# Patient Record
Sex: FEMALE | Race: WHITE | Employment: OTHER | URBAN - METROPOLITAN AREA
[De-identification: names, ages, dates, MRNs, and addresses within clinical notes are randomized per-mention and may not be internally consistent; named-entity substitution may affect disease eponyms.]

---

## 2023-02-04 ENCOUNTER — HOSPITAL ENCOUNTER (EMERGENCY)
Age: 68
Discharge: HOME | End: 2023-02-05
Attending: EMERGENCY MEDICINE
Payer: MEDICARE

## 2023-02-04 ENCOUNTER — APPOINTMENT (OUTPATIENT)
Dept: GENERAL RADIOLOGY | Age: 68
End: 2023-02-04
Attending: EMERGENCY MEDICINE
Payer: MEDICARE

## 2023-02-04 DIAGNOSIS — J20.9 ACUTE BRONCHITIS, UNSPECIFIED ORGANISM: ICD-10-CM

## 2023-02-04 DIAGNOSIS — J04.0 LARYNGITIS: Primary | ICD-10-CM

## 2023-02-04 LAB
ATRIAL RATE: 92 BPM
CALCULATED P AXIS, ECG09: 68 DEGREES
CALCULATED R AXIS, ECG10: 88 DEGREES
CALCULATED T AXIS, ECG11: 59 DEGREES
DIAGNOSIS, 93000: NORMAL
P-R INTERVAL, ECG05: 188 MS
Q-T INTERVAL, ECG07: 330 MS
QRS DURATION, ECG06: 72 MS
QTC CALCULATION (BEZET), ECG08: 408 MS
VENTRICULAR RATE, ECG03: 92 BPM

## 2023-02-04 PROCEDURE — 80048 BASIC METABOLIC PNL TOTAL CA: CPT

## 2023-02-04 PROCEDURE — 87636 SARSCOV2 & INF A&B AMP PRB: CPT

## 2023-02-04 PROCEDURE — 71045 X-RAY EXAM CHEST 1 VIEW: CPT

## 2023-02-04 PROCEDURE — 99285 EMERGENCY DEPT VISIT HI MDM: CPT

## 2023-02-04 PROCEDURE — 85379 FIBRIN DEGRADATION QUANT: CPT

## 2023-02-04 PROCEDURE — 85025 COMPLETE CBC W/AUTO DIFF WBC: CPT

## 2023-02-05 ENCOUNTER — APPOINTMENT (OUTPATIENT)
Dept: CT IMAGING | Age: 68
End: 2023-02-05
Attending: EMERGENCY MEDICINE
Payer: MEDICARE

## 2023-02-05 VITALS
SYSTOLIC BLOOD PRESSURE: 118 MMHG | RESPIRATION RATE: 18 BRPM | HEART RATE: 93 BPM | HEIGHT: 60 IN | DIASTOLIC BLOOD PRESSURE: 65 MMHG | OXYGEN SATURATION: 96 % | BODY MASS INDEX: 31.41 KG/M2 | WEIGHT: 160 LBS | TEMPERATURE: 98.1 F

## 2023-02-05 LAB
ANION GAP SERPL CALC-SCNC: 7 MMOL/L (ref 3–18)
BASOPHILS # BLD: 0 K/UL (ref 0–0.1)
BASOPHILS NFR BLD: 0 % (ref 0–2)
BUN SERPL-MCNC: 18 MG/DL (ref 7–18)
BUN/CREAT SERPL: 25 (ref 12–20)
CALCIUM SERPL-MCNC: 9.7 MG/DL (ref 8.5–10.1)
CHLORIDE SERPL-SCNC: 104 MMOL/L (ref 100–111)
CO2 SERPL-SCNC: 25 MMOL/L (ref 21–32)
CREAT SERPL-MCNC: 0.72 MG/DL (ref 0.6–1.3)
D DIMER PPP FEU-MCNC: 0.7 UG/ML(FEU)
DIFFERENTIAL METHOD BLD: ABNORMAL
EOSINOPHIL # BLD: 0.1 K/UL (ref 0–0.4)
EOSINOPHIL NFR BLD: 1 % (ref 0–5)
ERYTHROCYTE [DISTWIDTH] IN BLOOD BY AUTOMATED COUNT: 13.4 % (ref 11.6–14.5)
FLUAV RNA SPEC QL NAA+PROBE: NOT DETECTED
FLUBV RNA SPEC QL NAA+PROBE: NOT DETECTED
GLUCOSE SERPL-MCNC: 130 MG/DL (ref 74–99)
HCT VFR BLD AUTO: 40.8 % (ref 35–45)
HGB BLD-MCNC: 13.8 G/DL (ref 12–16)
IMM GRANULOCYTES # BLD AUTO: 0 K/UL
IMM GRANULOCYTES NFR BLD AUTO: 0 %
LYMPHOCYTES # BLD: 6.6 K/UL (ref 0.9–3.6)
LYMPHOCYTES NFR BLD: 48 % (ref 21–52)
MCH RBC QN AUTO: 30.9 PG (ref 24–34)
MCHC RBC AUTO-ENTMCNC: 33.8 G/DL (ref 31–37)
MCV RBC AUTO: 91.3 FL (ref 78–100)
MONOCYTES # BLD: 1.1 K/UL (ref 0.05–1.2)
MONOCYTES NFR BLD: 8 % (ref 3–10)
NEUTS SEG # BLD: 5.9 K/UL (ref 1.8–8)
NEUTS SEG NFR BLD: 43 % (ref 40–73)
NRBC # BLD: 0 K/UL (ref 0–0.01)
NRBC BLD-RTO: 0 PER 100 WBC
PLATELET # BLD AUTO: 401 K/UL (ref 135–420)
PLATELET COMMENTS,PCOM: ABNORMAL
PMV BLD AUTO: 9.2 FL (ref 9.2–11.8)
POTASSIUM SERPL-SCNC: 3.6 MMOL/L (ref 3.5–5.5)
RBC # BLD AUTO: 4.47 M/UL (ref 4.2–5.3)
RBC MORPH BLD: ABNORMAL
SARS-COV-2 RNA RESP QL NAA+PROBE: NOT DETECTED
SODIUM SERPL-SCNC: 136 MMOL/L (ref 136–145)
WBC # BLD AUTO: 13.7 K/UL (ref 4.6–13.2)

## 2023-02-05 PROCEDURE — 71275 CT ANGIOGRAPHY CHEST: CPT

## 2023-02-05 PROCEDURE — 74011250637 HC RX REV CODE- 250/637: Performed by: EMERGENCY MEDICINE

## 2023-02-05 PROCEDURE — 70490 CT SOFT TISSUE NECK W/O DYE: CPT

## 2023-02-05 PROCEDURE — 74011000636 HC RX REV CODE- 636: Performed by: EMERGENCY MEDICINE

## 2023-02-05 RX ORDER — BENZONATATE 100 MG/1
200 CAPSULE ORAL
Status: DISCONTINUED | OUTPATIENT
Start: 2023-02-05 | End: 2023-02-05 | Stop reason: HOSPADM

## 2023-02-05 RX ORDER — AZITHROMYCIN 250 MG/1
TABLET, FILM COATED ORAL
Qty: 4 TABLET | Refills: 0 | Status: SHIPPED | OUTPATIENT
Start: 2023-02-05 | End: 2023-02-10

## 2023-02-05 RX ORDER — IBUPROFEN 400 MG/1
800 TABLET ORAL
Status: COMPLETED | OUTPATIENT
Start: 2023-02-05 | End: 2023-02-05

## 2023-02-05 RX ORDER — KETOROLAC TROMETHAMINE 10 MG/1
10 TABLET, FILM COATED ORAL
Qty: 12 TABLET | Refills: 0 | Status: SHIPPED | OUTPATIENT
Start: 2023-02-05

## 2023-02-05 RX ORDER — AZITHROMYCIN 250 MG/1
500 TABLET, FILM COATED ORAL
Status: COMPLETED | OUTPATIENT
Start: 2023-02-05 | End: 2023-02-05

## 2023-02-05 RX ORDER — BENZONATATE 100 MG/1
200 CAPSULE ORAL
Qty: 20 CAPSULE | Refills: 0 | Status: SHIPPED | OUTPATIENT
Start: 2023-02-05 | End: 2023-02-12

## 2023-02-05 RX ADMIN — BENZONATATE 200 MG: 100 CAPSULE ORAL at 03:26

## 2023-02-05 RX ADMIN — IOPAMIDOL 87 ML: 755 INJECTION, SOLUTION INTRAVENOUS at 02:37

## 2023-02-05 RX ADMIN — IBUPROFEN 800 MG: 400 TABLET, FILM COATED ORAL at 03:26

## 2023-02-05 RX ADMIN — AZITHROMYCIN MONOHYDRATE 500 MG: 250 TABLET ORAL at 03:26

## 2023-02-06 NOTE — ED PROVIDER NOTES
[facility] EMERGENCY DEPT  EMERGENCY DEPARTMENT ENCOUNTER       Pt Name: Oleg Phan  MRN: [id number]  [place] 1955  Date of evaluation: 2/4/2023  Provider: Naseem Davidson MD   PCP: Nestor Johns DO  Note Started: 8:44 AM 2/6/23     CHIEF COMPLAINT       Chief Complaint   Patient presents with    Shortness of Breath        HISTORY OF PRESENT ILLNESS: 1 or more elements      History From: Patient  HPI Limitations : None     Oleg Phan is a 69 y.o. female who presents ED complaining of difficulty breathing with shortness of breath, hoarse voice. She reports symptoms began about a week ago with a sore throat. Sore throat was scratchy sensation in throat. She admits to some cough. Over the last day or so she began having increased congestion and hoarseness of voice. She also felt like she was not getting enough air. She has no headache, earache, chest pain, wheezing, or abdomen pain. She however admits to cough with production of yellowish sputum. She also admits to increased congestion in throat. She has taken over-the-counter meds with no relief. She denies exposure to COVID or flu. Nursing Notes were all reviewed and agreed with or any disagreements were addressed in the HPI. REVIEW OF SYSTEMS      Review of Systems     Positives and Pertinent negatives as per HPI. PAST HISTORY     Past Medical History:  No past medical history on file. Past Surgical History:  No past surgical history on file. Family History:  No family history on file. Social History:        Allergies:  No Known Allergies    CURRENT MEDICATIONS      Discharge Medication List as of 2/5/2023  3:21 AM          SCREENINGS               No data recorded         PHYSICAL EXAM      Vitals:    02/04/23 2350 02/05/23 0257 02/05/23 0312 02/05/23 0317   BP: (!) 155/85 135/78 115/65 118/65   Pulse:       Resp:       Temp:       SpO2: 99%  96% 96%   Weight:       Height:         Physical Exam    Nursing notes and vital signs reviewed    Constitutional: Non toxic appearing, no distress but has audible stridor. Head: Normocephalic, Atraumatic  Stridor is present on exam however uvula is midline is not swollen, airway appears open, there is minimal redness particularly right side of throat just behind the tonsils. No exudates noted. Eyes: EOMI  Neck: Supple  Cardiovascular: Regular rate and rhythm, no murmurs, rubs, or gallops  Chest: Normal work of breathing and chest excursion bilaterally  Lungs: Clear to ausculation bilaterally  Abdomen: Soft, non tender, non distended, normoactive bowel sounds  Back: No evidence of trauma or deformity  Extremities: No evidence of trauma or deformity, no LE edema  Skin: Warm and dry, normal cap refill  Neuro: Alert and appropriate, CN intact, normal speech, strength and sensation full and symmetric bilaterally, normal gait, normal coordination  Psychiatric: Normal mood and affect     DIAGNOSTIC RESULTS   LABS:     No results found for this or any previous visit (from the past 12 hour(s)). RADIOLOGY:  Non-plain film images such as CT, Ultrasound and MRI are read by the radiologist. Plain radiographic images are visualized and preliminarily interpreted by the ED Provider with the below findings:          Interpretation per the Radiologist below, if available at the time of this note:     No results found.       PROCEDURES        CRITICAL CARE TIME       EMERGENCY DEPARTMENT COURSE and DIFFERENTIAL DIAGNOSIS/MDM   Vitals:    Vitals:    02/04/23 2350 02/05/23 0257 02/05/23 0312 02/05/23 0317   BP: (!) 155/85 135/78 115/65 118/65   Pulse:       Resp:       Temp:       SpO2: 99%  96% 96%   Weight:       Height:            Patient was given the following medications:  Medications   iopamidoL (ISOVUE-370) 370 mg iodine /mL (76 %) injection 100 mL (87 mL IntraVENous Given 2/5/23 0237)   azithromycin (ZITHROMAX) tablet 500 mg (500 mg Oral Given 2/5/23 0326)   ibuprofen (MOTRIN) tablet 800 mg (800 mg Oral Given 2/5/23 0326)       CONSULTS: (Who and What was discussed)  None    Chronic Conditions:     Social Determinants affecting Dx or Tx: None    Records Reviewed (source and summary): Previous Radiology studies, Previous Laboratory studies, and Nursing notes    CC/HPI Summary, DDx, ED Course, and Reassessment:   Patient presents with laryngitis worse over the last 4 days. She however has concerning stridor and exam is only remarkable for slight erythema right throat. No exudates. Differential included foreign body, URI, malignancy, bronchitis, pneumonia. Chest x-ray initially showed questionable infiltrate right lower lobe. Soft tissue CT with no evidence of epiglottitis, there was noted increased mucus left side of throat. CTA chest showed no PE or pneumonia but incidental finding of 7 mm nodule right lower lobe. Patient now coughing up yellowish sputum and being discharged on Zithromax for possible early bronchitis. She was given referral to PCP and ENT and given precautions for returning to ED. Disposition Considerations (Tests not done, Shared Decision Making, Pt Expectation of Test or Tx.):      FINAL IMPRESSION     1. Laryngitis    2. Acute bronchitis, unspecified organism          DISPOSITION/PLAN   Discharged    Discharge Note: The patient is stable for discharge home. The signs, symptoms, diagnosis, and discharge instructions have been discussed, understanding conveyed, and agreed upon. The patient is to follow up as recommended or return to ER should their symptoms worsen.       PATIENT REFERRED TO:  Follow-up Information       Follow up With Specialties Details Why Contact Varghese Sandoval DO Internal Medicine Physician In 2 days  [address] [facility]      [facility] EMERGENCY DEPT Emergency Medicine  If symptoms worsen [address] [facility] [id number]  [phone]              DISCHARGE MEDICATIONS:  Discharge Medication List as of 2/5/2023  3:21 AM        START taking these medications    Details   azithromycin (Zithromax Z-Osvaldo) 250 mg tablet 1 tab daily, Normal, Disp-4 Tablet, R-0      benzonatate (Tessalon Perles) 100 mg capsule Take 2 Capsules by mouth three (3) times daily as needed for Cough for up to 7 days. , Normal, Disp-20 Capsule, R-0      ketorolac (TORADOL) 10 mg tablet Take 1 Tablet by mouth every eight (8) hours as needed for Pain., Normal, Disp-12 Tablet, R-0               DISCONTINUED MEDICATIONS:  Discharge Medication List as of 2/5/2023  3:21 AM          I am the Primary Clinician of Record. Georgianna Romberg, MD (electronically signed)    (Please note that parts of this dictation were completed with voice recognition software. Quite often unanticipated grammatical, syntax, homophones, and other interpretive errors are inadvertently transcribed by the computer software. Please disregards these errors.  Please excuse any errors that have escaped final proofreading.)